# Patient Record
Sex: MALE | Race: NATIVE HAWAIIAN OR OTHER PACIFIC ISLANDER | Employment: UNEMPLOYED | URBAN - METROPOLITAN AREA
[De-identification: names, ages, dates, MRNs, and addresses within clinical notes are randomized per-mention and may not be internally consistent; named-entity substitution may affect disease eponyms.]

---

## 2021-03-08 ENCOUNTER — TELEPHONE (OUTPATIENT)
Dept: PSYCHIATRY | Facility: CLINIC | Age: 26
End: 2021-03-08

## 2021-03-08 NOTE — TELEPHONE ENCOUNTER
Unable to leave message for patient to contact intake to schedule an appointment  Phone did not ring or go to voicemail, made multiple attempts

## 2021-03-29 ENCOUNTER — TELEPHONE (OUTPATIENT)
Dept: PSYCHIATRY | Facility: CLINIC | Age: 26
End: 2021-03-29

## 2021-04-23 NOTE — TELEPHONE ENCOUNTER
Left message for patient to inform that we are currently only scheduling appointments with a referral

## 2021-04-27 ENCOUNTER — TELEPHONE (OUTPATIENT)
Dept: PSYCHIATRY | Facility: CLINIC | Age: 26
End: 2021-04-27

## 2021-04-27 NOTE — TELEPHONE ENCOUNTER
Pt called to say he received a call from Remedios Pagan that he needed a referral  I told him to call the # on his insurance card and tell them of his situation because he does not live near here he is away at school and he needs to see a Dr for med management

## 2021-05-07 ENCOUNTER — OFFICE VISIT (OUTPATIENT)
Dept: FAMILY MEDICINE CLINIC | Facility: CLINIC | Age: 26
End: 2021-05-07
Payer: COMMERCIAL

## 2021-05-07 VITALS
WEIGHT: 179 LBS | RESPIRATION RATE: 16 BRPM | BODY MASS INDEX: 27.13 KG/M2 | DIASTOLIC BLOOD PRESSURE: 60 MMHG | OXYGEN SATURATION: 96 % | SYSTOLIC BLOOD PRESSURE: 90 MMHG | TEMPERATURE: 98.6 F | HEIGHT: 68 IN | HEART RATE: 72 BPM

## 2021-05-07 DIAGNOSIS — R53.83 OTHER FATIGUE: ICD-10-CM

## 2021-05-07 DIAGNOSIS — F32.A ANXIETY AND DEPRESSION: Primary | ICD-10-CM

## 2021-05-07 DIAGNOSIS — Z13.83 SCREENING FOR CARDIOVASCULAR, RESPIRATORY, AND GENITOURINARY DISEASES: ICD-10-CM

## 2021-05-07 DIAGNOSIS — F41.9 ANXIETY AND DEPRESSION: Primary | ICD-10-CM

## 2021-05-07 DIAGNOSIS — Z13.1 SCREENING FOR DIABETES MELLITUS (DM): ICD-10-CM

## 2021-05-07 DIAGNOSIS — Z13.6 SCREENING FOR CARDIOVASCULAR, RESPIRATORY, AND GENITOURINARY DISEASES: ICD-10-CM

## 2021-05-07 DIAGNOSIS — Z13.89 SCREENING FOR CARDIOVASCULAR, RESPIRATORY, AND GENITOURINARY DISEASES: ICD-10-CM

## 2021-05-07 PROCEDURE — 99204 OFFICE O/P NEW MOD 45 MIN: CPT | Performed by: FAMILY MEDICINE

## 2021-05-07 RX ORDER — ESCITALOPRAM OXALATE 10 MG/1
10 TABLET ORAL DAILY
Qty: 90 TABLET | Refills: 0 | Status: SHIPPED | OUTPATIENT
Start: 2021-05-07 | End: 2021-06-11 | Stop reason: SDUPTHER

## 2021-05-07 NOTE — PROGRESS NOTES
Assessment/Plan:    No problem-specific Assessment & Plan notes found for this encounter  New patient  Anxiety/depression new  Possible bipolar    Suggest medication and counseling  Agrees to ER if any serious suicidal thoughts  Check labs  F/u 1m     Diagnoses and all orders for this visit:    Anxiety and depression  -     escitalopram (LEXAPRO) 10 mg tablet; Take 1 tablet (10 mg total) by mouth daily  -     Ambulatory referral to Psychology; Future    Screening for cardiovascular, respiratory, and genitourinary diseases  -     Lipid Panel with Direct LDL reflex; Future    Screening for diabetes mellitus (DM)  -     Comprehensive metabolic panel; Future    Other fatigue  -     CBC and differential; Future  -     TSH, 3rd generation; Future      Return in about 1 month (around 6/7/2021) for Recheck  Subjective:      Patient ID: Mortimer Mode is a 23 y o  male      Chief Complaint   Patient presents with   [facility] Patient Visit     new patient here today c/o anxiety, jlopezcma     Anxiety       HPI  Under mom's plan  Used to see Dr Radha Lau  No chronic conditions    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    Feeling mood swings  New  Past 6 months  Some focus problems in school  Comprehension issues  Feels detached  Feels despair  Has negative thoughts, self criticism  Never like this before  Now new social anxiety  He is in a Fraternity  Feels isolated  Gets heart racing episodes    Then gets some euphoria feelings  Feels better at home  Grades getting affected    Had suicidal thoughts w/o plan at times  Friend committed suicide last year and he found him    No counseling yet  Available at school and has started the process    Social etoh on weekends  No current MJ or illicits    No fam hx of mood disorder    No prior tx    The following portions of the patient's history were reviewed and updated as appropriate: allergies, current medications, past family history, past medical history, past social history, past surgical history and problem list     Review of Systems   Constitutional: Positive for fatigue  Negative for fever  Eyes: Negative for redness  Respiratory: Negative for cough and shortness of breath  Cardiovascular: Negative for chest pain  Gastrointestinal: Negative for blood in stool, constipation and diarrhea  Genitourinary: Negative for hematuria  Musculoskeletal: Negative for arthralgias and myalgias  Skin: Negative for pallor and rash  Psychiatric/Behavioral: Positive for decreased concentration and dysphoric mood  Negative for hallucinations  The patient is nervous/anxious  Current Outpatient Medications   Medication Sig Dispense Refill    escitalopram (LEXAPRO) 10 mg tablet Take 1 tablet (10 mg total) by mouth daily 90 tablet 0     No current facility-administered medications for this visit  Objective:    BP 90/60   Pulse 72   Temp 98 6 °F (37 °C)   Resp 16   Ht 5' 8" (1 727 m)   Wt 81 2 kg (179 lb)   SpO2 96%   BMI 27 22 kg/m²        Physical Exam  Vitals signs and nursing note reviewed  Constitutional:       General: He is not in acute distress  Appearance: Normal appearance  He is well-developed  He is not ill-appearing or toxic-appearing  HENT:      Head: Normocephalic  Right Ear: Tympanic membrane, ear canal and external ear normal  There is no impacted cerumen  Left Ear: Tympanic membrane, ear canal and external ear normal  There is no impacted cerumen  Nose: No congestion  Eyes:      General: No scleral icterus  Conjunctiva/sclera: Conjunctivae normal    Neck:      Musculoskeletal: Neck supple  No neck rigidity  Thyroid: No thyromegaly  Cardiovascular:      Rate and Rhythm: Normal rate and regular rhythm  Heart sounds: No murmur  Pulmonary:      Effort: Pulmonary effort is normal  No respiratory distress  Breath sounds: No wheezing     Abdominal:      General: There is no distension  Palpations: Abdomen is soft  Tenderness: There is no abdominal tenderness  Musculoskeletal:         General: No deformity  Right lower leg: No edema  Left lower leg: No edema  Lymphadenopathy:      Cervical: No cervical adenopathy  Skin:     General: Skin is warm and dry  Coloration: Skin is not jaundiced or pale  Neurological:      Mental Status: He is alert  Motor: No weakness  Gait: Gait normal    Psychiatric:         Mood and Affect: Mood is depressed  Behavior: Behavior normal          Thought Content: Thought content normal          Judgment: Judgment normal        BMI Counseling: Body mass index is 27 22 kg/m²  The BMI is above normal  Nutrition recommendations include decreasing portion sizes and moderation in carbohydrate intake  Exercise recommendations include exercising 3-5 times per week  No pharmacotherapy was ordered         bmi not accurate due to muscle mass         Lior Schreiber DO

## 2021-06-15 ENCOUNTER — TELEPHONE (OUTPATIENT)
Dept: FAMILY MEDICINE CLINIC | Facility: CLINIC | Age: 26
End: 2021-06-15

## 2021-06-15 NOTE — TELEPHONE ENCOUNTER
Called pt regarding no show on 6/11/21  States he discussed with Dr Flores Borne his issues that evening  No f/up needed

## 2021-12-29 ENCOUNTER — OFFICE VISIT (OUTPATIENT)
Dept: DERMATOLOGY | Facility: CLINIC | Age: 26
End: 2021-12-29
Payer: COMMERCIAL

## 2021-12-29 VITALS — BODY MASS INDEX: 26.81 KG/M2 | TEMPERATURE: 97.3 F | WEIGHT: 181 LBS | HEIGHT: 69 IN

## 2021-12-29 DIAGNOSIS — D22.9 MULTIPLE MELANOCYTIC NEVI: Primary | ICD-10-CM

## 2021-12-29 PROCEDURE — 99202 OFFICE O/P NEW SF 15 MIN: CPT | Performed by: DERMATOLOGY

## 2021-12-29 NOTE — PROGRESS NOTES
[address] [facility] Note     Patient Name: Steven Jordan  Encounter Date: 12/29/2021     Have you been cared for by a [facility] Dermatologist in the last 3 years and, if so, which one? No    · Have you traveled outside of the [address] [place] [address] in the past 3 months or outside of the [facility] area in the last 2 weeks? No     May we call your Preferred Phone number to discuss your specific medical information? Yes     May we leave a detailed message that includes your specific medical information? Yes      Today's Chief Concerns:   Concern #1:  Skin exam   Concern #2:      Past Medical History:  Have you personally ever had or currently have any of the following? · Skin cancer (such as Melanoma, Basal Cell Carcinoma, Squamous Cell Carcinoma? (If Yes, please provide more detail)- No  · Eczema: No  · Psoriasis: No  · HIV/AIDS: No  · Hepatitis B or C: No  · Tuberculosis: No  · Systemic Immunosuppression such as Diabetes, Biologic or Immunotherapy, Chemotherapy, Organ Transplantation, Bone Marrow Transplantation (If YES, please provide more detail): No  · Radiation Treatment (If YES, please provide more detail): No  · Any other major medical conditions/concerns? (If Yes, which types)- No    Social History:     What is/was your primary occupation? Student     What are your hobbies/past-times? Family History:  Have any of your "first degree relatives" (parent, brother, sister, or child) had any of the following       · Skin cancer such as Melanoma or Merkel Cell Carcinoma or Pancreatic Cancer? No  · Eczema, Asthma, Hay Fever or Seasonal Allergies: No  · Psoriasis or Psoriatic Arthritis: No  · Do any other medical conditions seem to run in your family? If Yes, what condition and which relatives?   No    Current Medications:   (please update all dermatological medications before printing patient's AVS!)      Current Outpatient Medications:     escitalopram (LEXAPRO) 20 mg tablet, Take 1 tablet (20 mg total) by mouth daily, Disp: 90 tablet, Rfl: 1      Review of Systems:  Have you recently had or currently have any of the following? If YES, what are you doing for the problem? · Fever, chills or unintended weight loss: No  · Sudden loss or change in your vision: No  · Nausea, vomiting or blood in your stool: No  · Painful or swollen joints: No  · Wheezing or cough: No  · Changing mole or non-healing wound: No  · Nosebleeds: No  · Excessive sweating: No  · Easy or prolonged bleeding? No  · Over the last 2 weeks, how often have you been bothered by the following problems? · Taking little interest or pleasure in doing things: 1 - Not at All  · Feeling down, depressed, or hopeless: 1 - Not at All  · Rapid heartbeat with epinephrine:  No    · FEMALES ONLY:    · Are you pregnant or planning to become pregnant? N/A  · Are you currently or planning to be nursing or breast feeding? N/A    · Any known allergies? No Known Allergies      Physical Exam:     Was a chaperone (Derm Clinical Assistant) present throughout the entire Physical Exam? Yes     Did the Dermatology Team specifically  the patient on the importance of a Full Skin Exam to be sure that nothing is missed clinically?  Yes}  o Did the patient ultimately request or accept a Full Skin Exam?  Yes  o Did the patient specifically refuse to have the areas "under-the-bra" examined by the Dermatologist? No  o Did the patient specifically refuse to have the areas "under-the-underwear" examined by the Dermatologist? No    CONSTITUTIONAL:   Vitals:    12/29/21 0923   Temp: (!) 97 3 °F (36 3 °C)   Weight: 82 1 kg (181 lb)   Height: 5' 9" (1 753 m)       PSYCH: Normal mood and affect  EYES: Normal conjunctiva  ENT: Normal lips and oral mucosa  CARDIOVASCULAR: No edema  RESPIRATORY: Normal respirations  HEME/LYMPH/IMMUNO:  No regional lymphadenopathy except as noted below in "ASSESSMENT AND PLAN BY DIAGNOSIS"    SKIN:  FULL ORGAN SYSTEM EXAM   Hair, Scalp, Ears, Face Normal except as noted below in Assessment   Neck, Cervical Chain Nodes Normal except as noted below in Assessment   Right Arm/Hand/Fingers Normal except as noted below in Assessment   Left Arm/Hand/Fingers Normal except as noted below in Assessment   Chest/Breasts/Axillae Viewed areas Normal except as noted below in Assessment   Abdomen, Umbilicus Normal except as noted below in Assessment   Back/Spine Normal except as noted below in Assessment   Groin/Genitalia/Buttocks Normal except as noted below in Assessment   Right Leg, Foot, Toes Normal except as noted below in Assessment   Left Leg, Foot, Toes Normal except as noted below in Assessment        Assessment and Plan by Diagnosis:    MELANOCYTIC NEVI ("Moles")    Physical Exam:   Anatomic Location Affected:   Mostly on sun-exposed areas of the face,  trunk and extremities   A; Left back with 0 9 X 0 5 cm pink brown soft papule   Morphological Description:  Scattered, 1-4mm round to ovoid, symmetrical-appearing, even bordered, skin colored to dark brown macules/papules, mostly in sun-exposed areas   Pertinent Positives:   Pertinent Negatives: Additional History of Present Condition:      Assessment and Plan:  Based on a thorough discussion of this condition and the management approach to it (including a comprehensive discussion of the known risks, side effects and potential benefits of treatment), the patient (family) agrees to implement the following specific plan:   When outside we recommend using a wide brim hat, sunglasses, long sleeve and pants, sunscreen with SPF 17+ with reapplication every 2 hours, or SPF specific clothing    Benign, reassured   Annual skin check   Discussed with patient that removal of moles would leave a scar at the area where mole was removed   Made patient aware that we have no way of knowing how large or shape of the scar would be and if we removed by shaving there is a possibility of the mole growing back and that removed with punch would require sutures and will leave more of a scar  Patient is interested in moving forward with the more permanent removal and we will start with the mole on his nose   Monitor for any changes to the mole on your left back  Melanocytic Nevi  Melanocytic nevi ("moles") are tan or brown, raised or flat areas of the skin which have an increased number of melanocytes  Melanocytes are the cells in our body which make pigment and account for skin color  Some moles are present at birth (I e , "congenital nevi"), while others come up later in life (i e , "acquired nevi")  The sun can stimulate the body to make more moles  Sunburns are not the only thing that triggers more moles  Chronic sun exposure can do it too  Clinically distinguishing a healthy mole from melanoma may be difficult, even for experienced dermatologists  The "ABCDE's" of moles have been suggested as a means of helping to alert a person to a suspicious mole and the possible increased risk of melanoma  The suggestions for raising alert are as follows:    Asymmetry: Healthy moles tend to be symmetric, while melanomas are often asymmetric  Asymmetry means if you draw a line through the mole, the two halves do not match in color, size, shape, or surface texture  Asymmetry can be a result of rapid enlargement of a mole, the development of a raised area on a previously flat lesion, scaling, ulceration, bleeding or scabbing within the mole  Any mole that starts to demonstrate "asymmetry" should be examined promptly by a board certified dermatologist      Border: Healthy moles tend to have discrete, even borders  The border of a melanoma often blends into the normal skin and does not sharply delineate the mole from normal skin    Any mole that starts to demonstrate "uneven borders" should be examined promptly by a board certified dermatologist      Color: Healthy moles tend to be one color throughout  Melanomas tend to be made up of different colors ranging from dark black, blue, white, or red  Any mole that demonstrates a color change should be examined promptly by a board certified dermatologist      Diameter: Healthy moles tend to be smaller than 0 6 cm in size; an exception are "congenital nevi" that can be larger  Melanomas tend to grow and can often be greater than 0 6 cm (1/4 of an inch, or the size of a pencil eraser)  This is only a guideline, and many normal moles may be larger than 0 6 cm without being unhealthy  Any mole that starts to change in size (small to bigger or bigger to smaller) should be examined promptly by a board certified dermatologist      Evolving: Healthy moles tend to "stay the same "  Melanomas may often show signs of change or evolution such as a change in size, shape, color, or elevation    Any mole that starts to itch, bleed, crust, burn, hurt, or ulcerate or demonstrate a change or evolution should be examined promptly by a board certified dermatologist             Machelle Jonas,:  Lillian Shannon am acting as a scribe while in the presence of the attending physician :       I,:  Ifrah Arreola MD personally performed the services described in this documentation    as scribed in my presence :

## 2021-12-29 NOTE — PATIENT INSTRUCTIONS
MELANOCYTIC NEVI ("Moles")  Assessment and Plan:  Based on a thorough discussion of this condition and the management approach to it (including a comprehensive discussion of the known risks, side effects and potential benefits of treatment), the patient (family) agrees to implement the following specific plan:   When outside we recommend using a wide brim hat, sunglasses, long sleeve and pants, sunscreen with SPF 61+ with reapplication every 2 hours, or SPF specific clothing    Benign, reassured   Annual skin check   Discussed with patient that removal of moles would leave a scar at the area where mole was removed  Made patient aware that we have no way of knowing how large or shape of the scar would be and if we removed by shaving there is a possibility of the mole growing back and that removed with punch would require sutures and will leave more of a scar  Patient is interested in moving forward with the more permanent removal and we will start with the mole on his nose   Monitor for any changes to the mole on your left back  Melanocytic Nevi  Melanocytic nevi ("moles") are tan or brown, raised or flat areas of the skin which have an increased number of melanocytes  Melanocytes are the cells in our body which make pigment and account for skin color  Some moles are present at birth (I e , "congenital nevi"), while others come up later in life (i e , "acquired nevi")  The sun can stimulate the body to make more moles  Sunburns are not the only thing that triggers more moles  Chronic sun exposure can do it too  Clinically distinguishing a healthy mole from melanoma may be difficult, even for experienced dermatologists  The "ABCDE's" of moles have been suggested as a means of helping to alert a person to a suspicious mole and the possible increased risk of melanoma    The suggestions for raising alert are as follows:    Asymmetry: Healthy moles tend to be symmetric, while melanomas are often asymmetric  Asymmetry means if you draw a line through the mole, the two halves do not match in color, size, shape, or surface texture  Asymmetry can be a result of rapid enlargement of a mole, the development of a raised area on a previously flat lesion, scaling, ulceration, bleeding or scabbing within the mole  Any mole that starts to demonstrate "asymmetry" should be examined promptly by a board certified dermatologist      Border: Healthy moles tend to have discrete, even borders  The border of a melanoma often blends into the normal skin and does not sharply delineate the mole from normal skin  Any mole that starts to demonstrate "uneven borders" should be examined promptly by a board certified dermatologist      Color: Healthy moles tend to be one color throughout  Melanomas tend to be made up of different colors ranging from dark black, blue, white, or red  Any mole that demonstrates a color change should be examined promptly by a board certified dermatologist      Diameter: Healthy moles tend to be smaller than 0 6 cm in size; an exception are "congenital nevi" that can be larger  Melanomas tend to grow and can often be greater than 0 6 cm (1/4 of an inch, or the size of a pencil eraser)  This is only a guideline, and many normal moles may be larger than 0 6 cm without being unhealthy  Any mole that starts to change in size (small to bigger or bigger to smaller) should be examined promptly by a board certified dermatologist      Evolving: Healthy moles tend to "stay the same "  Melanomas may often show signs of change or evolution such as a change in size, shape, color, or elevation    Any mole that starts to itch, bleed, crust, burn, hurt, or ulcerate or demonstrate a change or evolution should be examined promptly by a board certified dermatologist

## 2022-01-26 ENCOUNTER — TELEPHONE (OUTPATIENT)
Dept: FAMILY MEDICINE CLINIC | Facility: CLINIC | Age: 27
End: 2022-01-26

## 2022-01-26 NOTE — TELEPHONE ENCOUNTER
He can set up a virtual visit to document his situation if he wants  He was started on lexapro on 5/7/21 if he needs to know

## 2022-01-26 NOTE — TELEPHONE ENCOUNTER
Patient would like to talk to Dr Shama Fuentes in regards to when he was rx'd and taking Lexapro  He is applying for [employer] [facility] and they are requesting his medication hx  He would like Dr Shama Fuentes to know that he is no longer on medication  He states he is in a much better mind set and that his need for medication was situational  He would like for this to be documented in chart and to discuss this Dr Shama Fuentes for if the [employer] and [employer] were to reach out to him as to why medication was rx'd  Advised I would send message to Dr Shama Fuentes, but he would need to come in for appointment or have virtual for documentation  Patient understands  Please advise    Jonny Rojas MA

## 2022-01-27 PROBLEM — R53.83 OTHER FATIGUE: Status: RESOLVED | Noted: 2021-05-07 | Resolved: 2022-01-27

## 2022-01-28 ENCOUNTER — TELEMEDICINE (OUTPATIENT)
Dept: FAMILY MEDICINE CLINIC | Facility: CLINIC | Age: 27
End: 2022-01-28
Payer: COMMERCIAL

## 2022-01-28 DIAGNOSIS — F41.1 ANXIETY IN ACUTE STRESS REACTION: Primary | ICD-10-CM

## 2022-01-28 DIAGNOSIS — F43.0 ANXIETY IN ACUTE STRESS REACTION: Primary | ICD-10-CM

## 2022-01-28 PROBLEM — F41.9 ANXIETY AND DEPRESSION: Status: RESOLVED | Noted: 2021-05-07 | Resolved: 2022-01-28

## 2022-01-28 PROBLEM — F32.A ANXIETY AND DEPRESSION: Status: RESOLVED | Noted: 2021-05-07 | Resolved: 2022-01-28

## 2022-01-28 PROCEDURE — 99213 OFFICE O/P EST LOW 20 MIN: CPT | Performed by: FAMILY MEDICINE

## 2022-01-28 NOTE — PROGRESS NOTES
Virtual Regular Visit    Verification of patient location:    Patient is located in the following state in which I hold an active license [place]    Currently doing well emotionally and without the use of medications  He has no mood symptoms of concern  In hindsight, issue in May 2021 was more reactive to a external situation that he has since recovered from without continued medication and has adequate coping mechanisms for the future  Assessment/Plan:    Problem List Items Addressed This Visit        Active Problems    Anxiety in acute stress reaction - Primary               Reason for visit is   Chief Complaint   Patient presents with    Virtual Regular Visit        Encounter provider Duncan Gamez DO    Provider located at  [address] [place] [id number]      Recent Visits  Date Type Provider Dept   01/26/22 Telephone Eemly Mario, [address] recent visits within past 7 days and meeting all other requirements  Today's Visits  Date Type Provider Dept   01/28/22 [address] [place], [address] today's visits and meeting all other requirements  Future Appointments  No visits were found meeting these conditions  Showing future appointments within next 150 days and meeting all other requirements       The patient was identified by name and date of birth  Cesar Johnson was informed that this is a telemedicine visit and that the visit is being conducted through [address] [place] [address] Now and patient was informed that this is a secure, HIPAA-compliant platform  He agrees to proceed     My office door was closed  No one else was in the room  He acknowledged consent and understanding of privacy and security of the video platform  The patient has agreed to participate and understands they can discontinue the visit at any time  Patient is aware this is a billable service       Subjective  Cesar Johnson is a 27 y o  male with history of anxiety   HPI     Applying for [employer]    Feeling better since last visit  Tari Chacon for about 5 months  Started lexapro 5/7/21  He did find it effective  Felt it was circumstances of the time:  School stress, finding a victim of suicide, covid related social isolation, more external that internal stressors  Weaned himself off and feeling ok since  Last pill may about 3m ago    Feeling better, feels what helped was a mixture of improved situation and coping  Did see a counselor    Overall doing ok  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    Denies suicidal/homicidal/destructive ideations  Has dealt with , thinking of [employer]    History reviewed  No pertinent past medical history  History reviewed  No pertinent surgical history  No current outpatient medications on file  No current facility-administered medications for this visit  No Known Allergies    Review of Systems   Psychiatric/Behavioral: Negative for dysphoric mood  The patient is not nervous/anxious  Video Exam    There were no vitals filed for this visit  Physical Exam   It was my intent to perform this visit via video technology but the patient was not able to do a video connection so the visit was completed via audio telephone only  I spent 22 minutes directly with the patient during this visit    VIRTUAL VISIT [address] [place] [address] verbally agrees to participate in [facility]  Pt is aware that [facility] could be limited without vital signs or the ability to perform a full hands-on physical Delilah Cortez understands he or the provider may request at any time to terminate the video visit and request the patient to seek care or treatment in person

## 2022-10-12 PROBLEM — Z13.89 SCREENING FOR CARDIOVASCULAR, RESPIRATORY, AND GENITOURINARY DISEASES: Status: RESOLVED | Noted: 2021-05-07 | Resolved: 2022-10-12

## 2022-10-12 PROBLEM — Z13.6 SCREENING FOR CARDIOVASCULAR, RESPIRATORY, AND GENITOURINARY DISEASES: Status: RESOLVED | Noted: 2021-05-07 | Resolved: 2022-10-12

## 2022-10-12 PROBLEM — Z13.1 SCREENING FOR DIABETES MELLITUS (DM): Status: RESOLVED | Noted: 2021-05-07 | Resolved: 2022-10-12

## 2022-10-12 PROBLEM — Z13.83 SCREENING FOR CARDIOVASCULAR, RESPIRATORY, AND GENITOURINARY DISEASES: Status: RESOLVED | Noted: 2021-05-07 | Resolved: 2022-10-12

## 2022-12-22 ENCOUNTER — OFFICE VISIT (OUTPATIENT)
Dept: URGENT CARE | Facility: CLINIC | Age: 27
End: 2022-12-22

## 2022-12-22 VITALS — OXYGEN SATURATION: 98 % | HEART RATE: 71 BPM | TEMPERATURE: 98.4 F | RESPIRATION RATE: 14 BRPM

## 2022-12-22 DIAGNOSIS — K92.1 BLOOD IN STOOL: Primary | ICD-10-CM

## 2022-12-22 NOTE — PROGRESS NOTES
[address] [facility]        NAME: Mag Silverman is a 24 y o  male  : 1995    MRN: [id number]  DATE: 2022  TIME: 5:34 PM    Assessment and Plan   Blood in stool [K92 1]  1  Blood in stool              Patient Instructions     Bright red blood per rectum x2 5 months  No visible external hemorrhoids  Likely internal hemorrhoids  Recommend follow up with PCP  Proceed to  ER if symptoms worsen  Chief Complaint     Chief Complaint   Patient presents with   • Rectal Bleeding     Pt presents with dark blood in stool that started 2-3 months ago; History of Present Illness       19-year-old male presents today complaining of bright red blood per rectum x2 and half months  He states that he notices that the bowl is bright red  He also notices streaking on the stool  The stool is not appear to be darker than usual   He notes blood on the toilet paper  He states that has been getting worse over the past 2 months  He denies any pain  He denies any gastrointestinal issues  He denies external hemorrhoids  He has not been seen previously for this  Review of Systems   Review of Systems   Constitutional: Negative for chills and fever  HENT: Negative for congestion, ear pain, postnasal drip, sinus pain and sore throat  Eyes: Negative for pain and itching  Respiratory: Negative for cough, shortness of breath and wheezing  Cardiovascular: Negative for chest pain and palpitations  Gastrointestinal: Positive for blood in stool  Negative for abdominal pain, constipation, diarrhea, nausea and vomiting  Genitourinary: Negative for difficulty urinating and hematuria  Musculoskeletal: Negative for arthralgias and myalgias  Skin: Negative for rash  Neurological: Negative for dizziness, light-headedness and headaches  Psychiatric/Behavioral: Negative for agitation and sleep disturbance  The patient is not nervous/anxious            Current Medications     No current outpatient medications on file  Current Allergies     Allergies as of 12/22/2022   • (No Known Allergies)            The following portions of the patient's history were reviewed and updated as appropriate: allergies, current medications, past family history, past medical history, past social history, past surgical history and problem list      History reviewed  No pertinent past medical history  History reviewed  No pertinent surgical history  History reviewed  No pertinent family history  Medications have been verified  Objective   Pulse 71   Temp 98 4 °F (36 9 °C)   Resp 14   SpO2 98%   No LMP for male patient  Physical Exam     Physical Exam  Vitals reviewed  Constitutional:       General: He is not in acute distress  Appearance: Normal appearance  He is not ill-appearing  HENT:      Head: Normocephalic and atraumatic  Eyes:      Extraocular Movements: Extraocular movements intact  Conjunctiva/sclera: Conjunctivae normal    Pulmonary:      Effort: Pulmonary effort is normal    Genitourinary:     Rectum: No external hemorrhoid  Skin:     General: Skin is warm  Neurological:      General: No focal deficit present  Mental Status: He is alert     Psychiatric:         Mood and Affect: Mood normal          Behavior: Behavior normal          Judgment: Judgment normal